# Patient Record
Sex: MALE | Race: WHITE | NOT HISPANIC OR LATINO | URBAN - METROPOLITAN AREA
[De-identification: names, ages, dates, MRNs, and addresses within clinical notes are randomized per-mention and may not be internally consistent; named-entity substitution may affect disease eponyms.]

---

## 2021-01-27 ENCOUNTER — OUTPATIENT (OUTPATIENT)
Dept: OUTPATIENT SERVICES | Facility: HOSPITAL | Age: 26
LOS: 1 days | End: 2021-01-27
Payer: COMMERCIAL

## 2021-01-27 VITALS
DIASTOLIC BLOOD PRESSURE: 86 MMHG | OXYGEN SATURATION: 97 % | WEIGHT: 197.09 LBS | HEART RATE: 94 BPM | RESPIRATION RATE: 16 BRPM | TEMPERATURE: 98 F | HEIGHT: 69 IN | SYSTOLIC BLOOD PRESSURE: 139 MMHG

## 2021-01-27 DIAGNOSIS — Z01.818 ENCOUNTER FOR OTHER PREPROCEDURAL EXAMINATION: ICD-10-CM

## 2021-01-27 DIAGNOSIS — S83.519A SPRAIN OF ANTERIOR CRUCIATE LIGAMENT OF UNSPECIFIED KNEE, INITIAL ENCOUNTER: Chronic | ICD-10-CM

## 2021-01-27 DIAGNOSIS — K08.409 PARTIAL LOSS OF TEETH, UNSPECIFIED CAUSE, UNSPECIFIED CLASS: Chronic | ICD-10-CM

## 2021-01-27 DIAGNOSIS — Z98.890 OTHER SPECIFIED POSTPROCEDURAL STATES: Chronic | ICD-10-CM

## 2021-01-27 DIAGNOSIS — T84.84XA PAIN DUE TO INTERNAL ORTHOPEDIC PROSTHETIC DEVICES, IMPLANTS AND GRAFTS, INITIAL ENCOUNTER: ICD-10-CM

## 2021-01-27 LAB
ANION GAP SERPL CALC-SCNC: 8 MMOL/L — SIGNIFICANT CHANGE UP (ref 5–17)
BASOPHILS # BLD AUTO: 0.05 K/UL — SIGNIFICANT CHANGE UP (ref 0–0.2)
BASOPHILS NFR BLD AUTO: 0.9 % — SIGNIFICANT CHANGE UP (ref 0–2)
BUN SERPL-MCNC: 18 MG/DL — SIGNIFICANT CHANGE UP (ref 7–23)
CALCIUM SERPL-MCNC: 10 MG/DL — SIGNIFICANT CHANGE UP (ref 8.5–10.1)
CHLORIDE SERPL-SCNC: 106 MMOL/L — SIGNIFICANT CHANGE UP (ref 96–108)
CO2 SERPL-SCNC: 26 MMOL/L — SIGNIFICANT CHANGE UP (ref 22–31)
CREAT SERPL-MCNC: 1.31 MG/DL — HIGH (ref 0.5–1.3)
EOSINOPHIL # BLD AUTO: 0.18 K/UL — SIGNIFICANT CHANGE UP (ref 0–0.5)
EOSINOPHIL NFR BLD AUTO: 3.4 % — SIGNIFICANT CHANGE UP (ref 0–6)
GLUCOSE SERPL-MCNC: 101 MG/DL — HIGH (ref 70–99)
HCT VFR BLD CALC: 52.2 % — HIGH (ref 39–50)
HGB BLD-MCNC: 18.2 G/DL — HIGH (ref 13–17)
IMM GRANULOCYTES NFR BLD AUTO: 0.2 % — SIGNIFICANT CHANGE UP (ref 0–1.5)
LYMPHOCYTES # BLD AUTO: 1.75 K/UL — SIGNIFICANT CHANGE UP (ref 1–3.3)
LYMPHOCYTES # BLD AUTO: 32.8 % — SIGNIFICANT CHANGE UP (ref 13–44)
MCHC RBC-ENTMCNC: 30.6 PG — SIGNIFICANT CHANGE UP (ref 27–34)
MCHC RBC-ENTMCNC: 34.9 GM/DL — SIGNIFICANT CHANGE UP (ref 32–36)
MCV RBC AUTO: 87.9 FL — SIGNIFICANT CHANGE UP (ref 80–100)
MONOCYTES # BLD AUTO: 0.41 K/UL — SIGNIFICANT CHANGE UP (ref 0–0.9)
MONOCYTES NFR BLD AUTO: 7.7 % — SIGNIFICANT CHANGE UP (ref 2–14)
NEUTROPHILS # BLD AUTO: 2.93 K/UL — SIGNIFICANT CHANGE UP (ref 1.8–7.4)
NEUTROPHILS NFR BLD AUTO: 55 % — SIGNIFICANT CHANGE UP (ref 43–77)
PLATELET # BLD AUTO: 174 K/UL — SIGNIFICANT CHANGE UP (ref 150–400)
POTASSIUM SERPL-MCNC: 4 MMOL/L — SIGNIFICANT CHANGE UP (ref 3.5–5.3)
POTASSIUM SERPL-SCNC: 4 MMOL/L — SIGNIFICANT CHANGE UP (ref 3.5–5.3)
RBC # BLD: 5.94 M/UL — HIGH (ref 4.2–5.8)
RBC # FLD: 11.9 % — SIGNIFICANT CHANGE UP (ref 10.3–14.5)
SODIUM SERPL-SCNC: 140 MMOL/L — SIGNIFICANT CHANGE UP (ref 135–145)
WBC # BLD: 5.33 K/UL — SIGNIFICANT CHANGE UP (ref 3.8–10.5)
WBC # FLD AUTO: 5.33 K/UL — SIGNIFICANT CHANGE UP (ref 3.8–10.5)

## 2021-01-27 PROCEDURE — G0463: CPT | Mod: 25

## 2021-01-27 PROCEDURE — 85025 COMPLETE CBC W/AUTO DIFF WBC: CPT

## 2021-01-27 PROCEDURE — 80048 BASIC METABOLIC PNL TOTAL CA: CPT

## 2021-01-27 PROCEDURE — 36415 COLL VENOUS BLD VENIPUNCTURE: CPT

## 2021-01-27 NOTE — H&P PST ADULT - NSICDXPASTMEDICALHX_GEN_ALL_CORE_FT
PAST MEDICAL HISTORY:  Asthma Seasonal/Exercise induced    H/O psoriasis     Torn ACL Right    Torn meniscus Right

## 2021-01-27 NOTE — H&P PST ADULT - TEACHING/LEARNING LEARNING PREFERENCES
audio/computer/internet/group instruction/individual instruction/pictorial/skill demonstration/verbal instruction/video/written material

## 2021-01-27 NOTE — H&P PST ADULT - NSICDXPASTSURGICALHX_GEN_ALL_CORE_FT
PAST SURGICAL HISTORY:  ACL tear and meniscus right knee repair---10/2018    History of repair of ACL Right ---1/2013    [place] teeth removed 3 removed --1/2020

## 2021-01-27 NOTE — H&P PST ADULT - NEGATIVE ENMT SYMPTOMS
no hearing difficulty/no tinnitus/no sinus symptoms/no post-nasal discharge/no throat pain/no dysphagia

## 2021-01-27 NOTE — H&P PST ADULT - ASSESSMENT
25 y.o male scheduled for  25 y.o male scheduled for Right Knee Open Removal/Excision of Deep Hardware and any indicated procedures   Plan  1. Stop all NSAIDS, herbal supplements and vitamins for 7 days.  2. NPO at midnight.  3. Take the following medications-none-- with small sips of water on the morning of your procedure/surgery.  4. Use EZ sponges as directed  5. CBC,BMP done today  6. COVID swab appt: 2/2/2021    Denies travel outside of state or country in the last 14 days.   Denies contact with known Coronavirus positive person.  Denies fever, chills, cough, congestion, runny nose, SOB or difficulty breathing, fatigue, muscle or body ache, headache. loss of taste or smell, N/V/D.

## 2021-01-27 NOTE — H&P PST ADULT - HISTORY OF PRESENT ILLNESS
25 y.o WD, WN pleasant  male presents for  25 y.o WD, WN pleasant  male presents for PST with hx of right knee pain. Patient with hx of right knee torn ACL while playing soccer in college. He had surgery back in 2013 and re -injured the right knee tearing ACL and meniscus in 2018. Patient states he had surgery in 2018 with placement of hardware. He did well initially but reports right knee pain, decreased ROM, swelling after exercise or walking for extended period of time. He has followed with ortho and now scheduled for Right Knee Open Removal/Excision of Deep Hardware and any indicated procedures

## 2021-01-28 DIAGNOSIS — T84.84XA PAIN DUE TO INTERNAL ORTHOPEDIC PROSTHETIC DEVICES, IMPLANTS AND GRAFTS, INITIAL ENCOUNTER: ICD-10-CM

## 2021-01-28 DIAGNOSIS — Z01.818 ENCOUNTER FOR OTHER PREPROCEDURAL EXAMINATION: ICD-10-CM

## 2021-02-05 ENCOUNTER — OUTPATIENT (OUTPATIENT)
Dept: INPATIENT UNIT | Facility: HOSPITAL | Age: 26
LOS: 1 days | Discharge: HOME | End: 2021-02-05
Payer: COMMERCIAL

## 2021-02-05 VITALS
RESPIRATION RATE: 18 BRPM | OXYGEN SATURATION: 97 % | HEART RATE: 72 BPM | DIASTOLIC BLOOD PRESSURE: 76 MMHG | TEMPERATURE: 97 F | SYSTOLIC BLOOD PRESSURE: 138 MMHG

## 2021-02-05 VITALS
SYSTOLIC BLOOD PRESSURE: 155 MMHG | WEIGHT: 197.09 LBS | DIASTOLIC BLOOD PRESSURE: 85 MMHG | OXYGEN SATURATION: 99 % | HEART RATE: 75 BPM | TEMPERATURE: 98 F | HEIGHT: 69 IN | RESPIRATION RATE: 16 BRPM

## 2021-02-05 DIAGNOSIS — S83.519A SPRAIN OF ANTERIOR CRUCIATE LIGAMENT OF UNSPECIFIED KNEE, INITIAL ENCOUNTER: Chronic | ICD-10-CM

## 2021-02-05 DIAGNOSIS — Z98.890 OTHER SPECIFIED POSTPROCEDURAL STATES: Chronic | ICD-10-CM

## 2021-02-05 DIAGNOSIS — T84.84XA PAIN DUE TO INTERNAL ORTHOPEDIC PROSTHETIC DEVICES, IMPLANTS AND GRAFTS, INITIAL ENCOUNTER: ICD-10-CM

## 2021-02-05 DIAGNOSIS — K08.409 PARTIAL LOSS OF TEETH, UNSPECIFIED CAUSE, UNSPECIFIED CLASS: Chronic | ICD-10-CM

## 2021-02-05 PROCEDURE — 86900 BLOOD TYPING SEROLOGIC ABO: CPT

## 2021-02-05 PROCEDURE — 86901 BLOOD TYPING SEROLOGIC RH(D): CPT

## 2021-02-05 PROCEDURE — 36415 COLL VENOUS BLD VENIPUNCTURE: CPT

## 2021-02-05 PROCEDURE — 76000 FLUOROSCOPY <1 HR PHYS/QHP: CPT

## 2021-02-05 PROCEDURE — 71045 X-RAY EXAM CHEST 1 VIEW: CPT

## 2021-02-05 PROCEDURE — 86850 RBC ANTIBODY SCREEN: CPT

## 2021-02-05 PROCEDURE — 71045 X-RAY EXAM CHEST 1 VIEW: CPT | Mod: 26

## 2021-02-05 RX ORDER — USTEKINUMAB 45 MG/.5ML
0 INJECTION, SOLUTION SUBCUTANEOUS
Qty: 0 | Refills: 0 | DISCHARGE

## 2021-02-05 RX ORDER — ONDANSETRON 8 MG/1
4 TABLET, FILM COATED ORAL ONCE
Refills: 0 | Status: DISCONTINUED | OUTPATIENT
Start: 2021-02-05 | End: 2021-02-05

## 2021-02-05 RX ORDER — SODIUM CHLORIDE 9 MG/ML
1000 INJECTION, SOLUTION INTRAVENOUS
Refills: 0 | Status: DISCONTINUED | OUTPATIENT
Start: 2021-02-05 | End: 2021-02-05

## 2021-02-05 RX ORDER — OXYCODONE HYDROCHLORIDE 5 MG/1
10 TABLET ORAL ONCE
Refills: 0 | Status: DISCONTINUED | OUTPATIENT
Start: 2021-02-05 | End: 2021-02-05

## 2021-02-05 RX ORDER — CETIRIZINE HYDROCHLORIDE 10 MG/1
1 TABLET ORAL
Qty: 0 | Refills: 0 | DISCHARGE

## 2021-02-05 RX ORDER — ALBUTEROL 90 UG/1
2 AEROSOL, METERED ORAL
Qty: 0 | Refills: 0 | DISCHARGE

## 2021-02-05 RX ORDER — FENTANYL CITRATE 50 UG/ML
50 INJECTION INTRAVENOUS
Refills: 0 | Status: DISCONTINUED | OUTPATIENT
Start: 2021-02-05 | End: 2021-02-05

## 2021-02-05 RX ORDER — FLUTICASONE PROPIONATE 50 MCG
0 SPRAY, SUSPENSION NASAL
Qty: 0 | Refills: 0 | DISCHARGE

## 2021-02-05 RX ADMIN — SODIUM CHLORIDE 75 MILLILITER(S): 9 INJECTION, SOLUTION INTRAVENOUS at 14:53

## 2021-02-05 RX ADMIN — OXYCODONE HYDROCHLORIDE 10 MILLIGRAM(S): 5 TABLET ORAL at 14:53

## 2021-02-05 NOTE — ASU PATIENT PROFILE, ADULT - NSALCOHOLAMT_GEN_A_CORE_SD
I will SWITCH the dose or number of times a day I take the medications listed below when I get home from the hospital:  None
1-2 drinks

## 2021-02-05 NOTE — ASU PATIENT PROFILE, ADULT - PSH
ACL tear  and meniscus right knee repair---10/2018  History of repair of ACL  Right ---1/2013  [place] teeth removed  3 removed --1/2020

## 2021-02-05 NOTE — ASU DISCHARGE PLAN (ADULT/PEDIATRIC) - NURSING INSTRUCTIONS
For any problems or concerns,contact your doctor. [facility] patients should call the [facility]. If you cannot reach the doctor or clinic, call [facility] Emergency Department at [phone] or go to your local Emergency Department.  A responsible adult should be with you for the rest of the day and night for your safety and to help you if you needed. Resume your medications as listed on the attached Medication Record. Begin with liquids and light food ( tea, toast, Jello, soups). Advance to what you normally eat. Liquids should taken in adequate amounts today.     CALL the DOCTOR:    -Fever greater than  101F  - Signs  of infection such as : increase pain,swelling,redness,or a bad  smell coming from the wound.  -Excessive amount of bleeding.  - Any pain that appears to be getting worse.  - Vomiting  -  If you have  not urinated 8 hours after surgery or have any difficulty urinating.     A responsible adult should be with you for the rest of the day and night for your safety and to help you if you needed.    Review attached FACT SHEET if applicable.

## 2021-02-05 NOTE — ASU DISCHARGE PLAN (ADULT/PEDIATRIC) - ASU DC SPECIAL INSTRUCTIONSFT
Knee Arthroscopy Instructions    1) Your knee will swell over the next 48hours and you can expect pain to get a bit worse. Ice your Knee plenty, continuously if possible. Fill up a plastic bag, then wrap it in a towel or pillow case.     2) Elevate your leg above your heart with about 3 pillows when you can, when you are in bed or chair. Otherwise you should be up and about, walking as much as you can tolerate. The more you move the better you will do. Weight bearing as tolerated.    3) Expect some bloody drainage. It is normal. It may even soak through the gauze and ACE bandage and look bloody. This is mostly leftover Saline fluid coming out of your knee from surgery. Even a drop of blood can make it look very bloody. Just place another Gauze and another ACE over it and wrap it snug but not overly tight. If it bleeds through the second bandage again, call.    4) Remove bandage in 48hrs and place waterproof band aides. You can shower in 48 hours. No bath. Pat your incisions dry. No creams, no lotions.    5) Only reason to worry would be if pain got so severe that you cannot feel or wiggle your toes, or if your foot is cold. In this case you need to call or come to the ER. But as long as you can feel and wiggle your toes, you are fine.    6) Call the office to schedule a follow up appointment to be seen in 10 days. Your Sutures will be removed at that point.    7) A pain Rx is in the chart; fill it on your way home. OR was sent electronically to your pharmacy, pick it up on the way home.    8) Please begin  for DVT prophylaxis tomorrow

## 2021-02-05 NOTE — ASU DISCHARGE PLAN (ADULT/PEDIATRIC) - CARE PROVIDER_API CALL
Farooq Cherry)  Orthopaedic Surgery; Sports Medicine  [address] [place] [address]  Phone: [phone]  Fax: [phone]  Follow Up Time:

## 2021-02-12 DIAGNOSIS — M76.31 ILIOTIBIAL BAND SYNDROME, RIGHT LEG: ICD-10-CM

## 2021-02-12 DIAGNOSIS — J45.909 UNSPECIFIED ASTHMA, UNCOMPLICATED: ICD-10-CM

## 2022-07-28 ENCOUNTER — OUTPATIENT (OUTPATIENT)
Dept: OUTPATIENT SERVICES | Facility: HOSPITAL | Age: 27
LOS: 1 days | End: 2022-07-28

## 2022-07-28 DIAGNOSIS — Z00.00 ENCOUNTER FOR GENERAL ADULT MEDICAL EXAMINATION WITHOUT ABNORMAL FINDINGS: ICD-10-CM

## 2022-07-28 DIAGNOSIS — K08.409 PARTIAL LOSS OF TEETH, UNSPECIFIED CAUSE, UNSPECIFIED CLASS: Chronic | ICD-10-CM

## 2022-07-28 DIAGNOSIS — S83.519A SPRAIN OF ANTERIOR CRUCIATE LIGAMENT OF UNSPECIFIED KNEE, INITIAL ENCOUNTER: Chronic | ICD-10-CM

## 2022-07-28 DIAGNOSIS — Z98.890 OTHER SPECIFIED POSTPROCEDURAL STATES: Chronic | ICD-10-CM

## 2022-07-28 PROBLEM — J45.909 UNSPECIFIED ASTHMA, UNCOMPLICATED: Chronic | Status: ACTIVE | Noted: 2021-01-27

## 2022-07-28 PROBLEM — S83.209A UNSPECIFIED TEAR OF UNSPECIFIED MENISCUS, CURRENT INJURY, UNSPECIFIED KNEE, INITIAL ENCOUNTER: Chronic | Status: ACTIVE | Noted: 2021-01-27

## 2022-07-28 PROBLEM — Z87.2 PERSONAL HISTORY OF DISEASES OF THE SKIN AND SUBCUTANEOUS TISSUE: Chronic | Status: ACTIVE | Noted: 2021-01-27

## 2022-08-03 PROBLEM — Z00.00 ENCOUNTER FOR PREVENTIVE HEALTH EXAMINATION: Status: ACTIVE | Noted: 2022-08-03

## 2022-08-05 ENCOUNTER — NON-APPOINTMENT (OUTPATIENT)
Age: 27
End: 2022-08-05

## 2022-08-05 ENCOUNTER — APPOINTMENT (OUTPATIENT)
Dept: CARDIOLOGY | Facility: CLINIC | Age: 27
End: 2022-08-05

## 2022-08-05 VITALS
WEIGHT: 190 LBS | HEART RATE: 110 BPM | OXYGEN SATURATION: 98 % | SYSTOLIC BLOOD PRESSURE: 172 MMHG | BODY MASS INDEX: 28.14 KG/M2 | HEIGHT: 69 IN | DIASTOLIC BLOOD PRESSURE: 82 MMHG

## 2022-08-05 VITALS — DIASTOLIC BLOOD PRESSURE: 84 MMHG | SYSTOLIC BLOOD PRESSURE: 142 MMHG

## 2022-08-05 DIAGNOSIS — Z78.9 OTHER SPECIFIED HEALTH STATUS: ICD-10-CM

## 2022-08-05 DIAGNOSIS — Z82.49 FAMILY HISTORY OF ISCHEMIC HEART DISEASE AND OTHER DISEASES OF THE CIRCULATORY SYSTEM: ICD-10-CM

## 2022-08-05 DIAGNOSIS — J45.909 UNSPECIFIED ASTHMA, UNCOMPLICATED: ICD-10-CM

## 2022-08-05 DIAGNOSIS — L40.9 PSORIASIS, UNSPECIFIED: ICD-10-CM

## 2022-08-05 PROCEDURE — 93000 ELECTROCARDIOGRAM COMPLETE: CPT

## 2022-08-05 PROCEDURE — 99204 OFFICE O/P NEW MOD 45 MIN: CPT | Mod: 25

## 2022-08-05 RX ORDER — ALBUTEROL 90 MCG
90 AEROSOL (GRAM) INHALATION
Refills: 0 | Status: ACTIVE | COMMUNITY

## 2022-08-05 RX ORDER — USTEKINUMAB 45 MG/.5ML
45 INJECTION, SOLUTION SUBCUTANEOUS
Qty: 1 | Refills: 0 | Status: ACTIVE | COMMUNITY
Start: 2022-01-17

## 2022-08-05 NOTE — HISTORY OF PRESENT ILLNESS
[FreeTextEntry1] : 27 year old male sent for cardiac evaluation after pre-employment physical with employee health at [employer] revealed elevated BP readings. Patient states he gets nervous in doctor offices. He has an automated BP machine at home and typically gets <130/80 when relaxed. 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 Prior to him getting his physical he drank a couple of energy drinks and coffee as he works overnights. His sleep is limited at times. Since his employee physical patient has stopped all caffeine. He has no exertional chest pain, dyspnea, or palpitations.\par \par There is no history of MI, CVA, CHF, or previous coronary intervention.\par

## 2022-08-05 NOTE — DISCUSSION/SUMMARY
[FreeTextEntry1] : 1. Hypertension: I suspect reactive component. Asked patient to avoid energy drinks/excessive caffeine intake. Try to get adequate amounts of sleep. Patient's BP cam down to 140s/80s after sitting a few minutes in office. Discussed options, including beta blocker therapy. Discussed side effect profile of beta blocker. Agrees to start Toprol XL 25mg daily. Recommend ETT on Metoprolol to assess BP response to exercise. Will check TSH\par \par Follow up after testing.

## 2022-08-09 ENCOUNTER — APPOINTMENT (OUTPATIENT)
Dept: CARDIOLOGY | Facility: CLINIC | Age: 27
End: 2022-08-09

## 2022-08-09 PROCEDURE — 93015 CV STRESS TEST SUPVJ I&R: CPT

## 2022-08-16 PROBLEM — Z00.00 ENCOUNTER FOR PREVENTIVE HEALTH EXAMINATION: Noted: 2022-08-16

## 2022-08-17 ENCOUNTER — APPOINTMENT (OUTPATIENT)
Dept: CARDIOLOGY | Facility: CLINIC | Age: 27
End: 2022-08-17

## 2022-08-17 VITALS
SYSTOLIC BLOOD PRESSURE: 148 MMHG | WEIGHT: 190 LBS | BODY MASS INDEX: 28.14 KG/M2 | HEART RATE: 83 BPM | OXYGEN SATURATION: 100 % | DIASTOLIC BLOOD PRESSURE: 80 MMHG | HEIGHT: 69 IN

## 2022-08-17 PROCEDURE — 99214 OFFICE O/P EST MOD 30 MIN: CPT

## 2022-08-17 NOTE — CARDIOLOGY SUMMARY
[de-identified] : 8/5/2022, Sinus Tachycardia, otherwise normal ECG [de-identified] : 8/9/2022, Plain Treadmill Stress Test: Exercised for 11 minutes and 43 seconds utilizing Standard Jose Protocol. No chest pain or abnormal dyspnea. No ECG changes to suggest ischemia. Resting BP- 150/68, Peak /80.\par

## 2022-08-17 NOTE — HISTORY OF PRESENT ILLNESS
[FreeTextEntry1] : Historical Perspective:\par 27 year old male sent for cardiac evaluation after pre-employment physical with employee health at [employer] revealed elevated BP readings. Patient states he gets nervous in doctor offices. He has an automated BP machine at home and typically gets <130/80 when relaxed. 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 His sleep is limited at times. Since his employee physical patient has stopped all caffeine. He has no exertional chest pain, dyspnea, or palpitations.\par \par There is no history of MI, CVA, CHF, or previous coronary intervention.\par \par Current Health Status:\par Patient with no chest pain, SOB, or palpitations. No hospitalizations since seeing me last. Remains compliant with his medications and reports no adverse effects.\par \par

## 2022-08-17 NOTE — DISCUSSION/SUMMARY
[FreeTextEntry1] : 1. Hypertension: I suspect reactive component. Asked patient to continue to avoid energy drinks/excessive caffeine intake. Try to get adequate amounts of sleep. Patient's BP improved on Toprol XL 25mg daily. Recommend continuing. Discussed side effect profile, which he hasn't experienced any. Recommend secondary workup, which include 24 hour urine, labs, and CTA of the abdomen and pelvis. Should hold metoprolol 3 days prior to his urine and blood draws. Will make further recommendations once testing reviewed. \par \par Follow up in 6 months.

## 2023-02-16 ENCOUNTER — APPOINTMENT (OUTPATIENT)
Dept: FAMILY MEDICINE | Facility: CLINIC | Age: 28
End: 2023-02-16

## 2023-02-21 ENCOUNTER — NON-APPOINTMENT (OUTPATIENT)
Age: 28
End: 2023-02-21

## 2023-02-21 ENCOUNTER — APPOINTMENT (OUTPATIENT)
Dept: CARDIOLOGY | Facility: CLINIC | Age: 28
End: 2023-02-21
Payer: COMMERCIAL

## 2023-02-21 VITALS
OXYGEN SATURATION: 98 % | HEART RATE: 99 BPM | BODY MASS INDEX: 28.14 KG/M2 | HEIGHT: 69 IN | SYSTOLIC BLOOD PRESSURE: 156 MMHG | DIASTOLIC BLOOD PRESSURE: 80 MMHG | WEIGHT: 190 LBS

## 2023-02-21 PROCEDURE — 99214 OFFICE O/P EST MOD 30 MIN: CPT | Mod: 25

## 2023-02-21 PROCEDURE — 93000 ELECTROCARDIOGRAM COMPLETE: CPT

## 2023-02-21 RX ORDER — METOPROLOL SUCCINATE 25 MG/1
25 TABLET, EXTENDED RELEASE ORAL DAILY
Qty: 90 | Refills: 3 | Status: DISCONTINUED | COMMUNITY
Start: 2022-08-05 | End: 2023-02-21

## 2023-02-21 NOTE — DISCUSSION/SUMMARY
[FreeTextEntry1] : 1. Hypertension: Asked patient to continue to avoid energy drinks/excessive caffeine intake. Try to get adequate amounts of sleep. Patient's BP improved on Toprol XL 25mg daily, but did not continue taking it after his 30 day supply ended. I explained to patient that this will likely be a long term medication.  Recommend continuing. Discussed side effect profile, which he hasn't experienced any. Recommended secondary workup, which included 24 hour urine, labs, and CTA of the abdomen and pelvis if any abnormalities demonstrated. Will make further recommendations once testing reviewed. \par \par Follow up in 3 months. [EKG obtained to assist in diagnosis and management of assessed problem(s)] : EKG obtained to assist in diagnosis and management of assessed problem(s)

## 2023-02-21 NOTE — CARDIOLOGY SUMMARY
[de-identified] : 2/21/2023, NSR. [de-identified] : 8/9/2022, Plain Treadmill Stress Test: Exercised for 11 minutes and 43 seconds utilizing Standard Jose Protocol. No chest pain or abnormal dyspnea. No ECG changes to suggest ischemia. Resting BP- 150/68, Peak /80.\par

## 2023-02-21 NOTE — HISTORY OF PRESENT ILLNESS
[FreeTextEntry1] : Historical Perspective:\par 27 year old male sent for cardiac evaluation after pre-employment physical with employee health at [employer] revealed elevated BP readings. Patient states he gets nervous in doctor offices. He has an automated BP machine at home and typically gets <130/80 when relaxed. 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 His sleep is limited at times. Since his employee physical patient has stopped all caffeine. He has no exertional chest pain, dyspnea, or palpitations.\par \par There is no history of MI, CVA, CHF, or previous coronary intervention.\par \par Current Health Status:\par Patient with no chest pain, SOB, or palpitations. No hospitalizations since seeing me last. Patient ran out of Metoprolol after the 30 day supply. Didn't renew it. Just got labs and urine studies done over the weekend.\par \par

## 2023-05-23 ENCOUNTER — NON-APPOINTMENT (OUTPATIENT)
Age: 28
End: 2023-05-23

## 2023-05-23 ENCOUNTER — APPOINTMENT (OUTPATIENT)
Dept: CARDIOLOGY | Facility: CLINIC | Age: 28
End: 2023-05-23
Payer: COMMERCIAL

## 2023-05-23 VITALS
HEIGHT: 69 IN | OXYGEN SATURATION: 98 % | WEIGHT: 185 LBS | BODY MASS INDEX: 27.4 KG/M2 | DIASTOLIC BLOOD PRESSURE: 82 MMHG | HEART RATE: 86 BPM | SYSTOLIC BLOOD PRESSURE: 148 MMHG

## 2023-05-23 PROCEDURE — 99214 OFFICE O/P EST MOD 30 MIN: CPT

## 2023-05-23 NOTE — HISTORY OF PRESENT ILLNESS
[FreeTextEntry1] : Historical Perspective:\par 28 year old male sent for cardiac evaluation after pre-employment physical with employee health at [employer] revealed elevated BP readings. Patient states he gets nervous in doctor offices. He has an automated BP machine at home and typically gets <130/80 when relaxed. 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 His sleep is limited at times. Since his employee physical patient has stopped all caffeine. He has no exertional chest pain, dyspnea, or palpitations.\par \par There is no history of MI, CVA, CHF, or previous coronary intervention.\par \par Current Health Status:\par Patient with no chest pain, SOB, or palpitations. No hospitalizations since seeing me last. Remains compliant with his medications and reports no adverse effects.\par .\par \par

## 2023-05-23 NOTE — DISCUSSION/SUMMARY
[FreeTextEntry1] : 1. Hypertension: Asked patient to continue to avoid energy drinks/excessive caffeine intake. Try to get adequate amounts of sleep. Patient's BP improved on Toprol XL 25mg daily. I explained to patient that this will likely be a long term medication.  Recommend continuing. Discussed side effect profile, which he hasn't experienced any. Recommended secondary workup, which included 24 hour urine, labs, and CTA of the abdomen and pelvis if any abnormalities demonstrated. Will make further recommendations once testing reviewed. \par \par Follow up in 12 months.

## 2023-05-23 NOTE — CARDIOLOGY SUMMARY
[de-identified] : 2/21/2023, NSR. [de-identified] : 8/9/2022, Plain Treadmill Stress Test: Exercised for 11 minutes and 43 seconds utilizing Standard Jose Protocol. No chest pain or abnormal dyspnea. No ECG changes to suggest ischemia. Resting BP- 150/68, Peak /80.\par

## 2023-06-09 ENCOUNTER — APPOINTMENT (OUTPATIENT)
Dept: ORTHOPEDIC SURGERY | Facility: CLINIC | Age: 28
End: 2023-06-09
Payer: COMMERCIAL

## 2023-06-09 VITALS — WEIGHT: 185 LBS | HEIGHT: 69 IN | BODY MASS INDEX: 27.4 KG/M2

## 2023-06-09 PROCEDURE — 73502 X-RAY EXAM HIP UNI 2-3 VIEWS: CPT

## 2023-06-09 PROCEDURE — 99203 OFFICE O/P NEW LOW 30 MIN: CPT

## 2023-06-09 NOTE — HISTORY OF PRESENT ILLNESS
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 He has since developed ecchymosis posterior thigh and has been ambulating with a limp \par \par pmh prior right knee acl * 3 most recently 20`8  revision by Dr Cherry [] : no [FreeTextEntry1] : right groin  [FreeTextEntry3] : 5/30/23 [FreeTextEntry5] : patient was injured while playing soccer. Mentions that he has had sx on the right knee 3 times

## 2023-06-09 NOTE — PHYSICAL EXAM
[Right] : right hip with pelvis [AP] : anteroposterior [Lateral] : lateral [There are no fractures, subluxations or dislocations. No significant abnormalities are seen] : There are no fractures, subluxations or dislocations. No significant abnormalities are seen

## 2023-06-09 NOTE — IMAGING
[Right] : right hip [de-identified] : right thigh- sif + ecchymosis posterior medial, + ttp proximal adductor brittany , ambulates with limp  [FreeTextEntry9] : and pelvis normal no avulsions appreciated

## 2023-07-20 ENCOUNTER — APPOINTMENT (OUTPATIENT)
Dept: ORTHOPEDIC SURGERY | Facility: CLINIC | Age: 28
End: 2023-07-20
Payer: COMMERCIAL

## 2023-07-20 VITALS — WEIGHT: 185 LBS | HEIGHT: 69 IN | BODY MASS INDEX: 27.4 KG/M2

## 2023-07-20 PROCEDURE — 99213 OFFICE O/P EST LOW 20 MIN: CPT

## 2023-07-20 NOTE — REASON FOR VISIT
[FreeTextEntry2] : 7/20/23- Ecchymosis improving, pain lessening, hasn't been able to start any PT of yet

## 2023-07-20 NOTE — HISTORY OF PRESENT ILLNESS
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 He has since developed ecchymosis posterior thigh and has been ambulating with a limp \par \par pmh prior right knee acl * 3 most recently 20`8  revision by Dr Cherry [] : no [FreeTextEntry1] : right groin  [FreeTextEntry3] : 5/30/23 [FreeTextEntry5] : patient was injured while playing soccer. Mentions that he has had sx on the right knee 3 times

## 2023-07-20 NOTE — PHYSICAL EXAM
[] : adductor tenderness [Right] : right hip with pelvis [AP] : anteroposterior [Lateral] : lateral [There are no fractures, subluxations or dislocations. No significant abnormalities are seen] : There are no fractures, subluxations or dislocations. No significant abnormalities are seen

## 2023-08-24 ENCOUNTER — APPOINTMENT (OUTPATIENT)
Dept: ORTHOPEDIC SURGERY | Facility: CLINIC | Age: 28
End: 2023-08-24
Payer: COMMERCIAL

## 2023-08-24 DIAGNOSIS — S76.211A STRAIN OF ADDUCTOR MUSCLE, FASCIA AND TENDON OF RIGHT THIGH, INITIAL ENCOUNTER: ICD-10-CM

## 2023-08-24 PROCEDURE — 99213 OFFICE O/P EST LOW 20 MIN: CPT

## 2023-08-24 NOTE — HISTORY OF PRESENT ILLNESS
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 He has since developed ecchymosis posterior thigh and has been ambulating with a limp \par  \par  pmh prior right knee acl * 3 most recently 20`8  revision by Dr Cherry [] : no [FreeTextEntry1] : right groin  [FreeTextEntry3] : 5/30/23 [FreeTextEntry5] : patient was injured while playing soccer. Mentions that he has had sx on the right knee 3 times

## 2023-08-24 NOTE — REASON FOR VISIT
[FreeTextEntry2] : 8/24/23- Doing better, has been doing PT 7/20/23- Ecchymosis improving, pain lessening, hasn't been able to start any PT of yet

## 2024-03-11 ENCOUNTER — RX RENEWAL (OUTPATIENT)
Age: 29
End: 2024-03-11

## 2024-04-24 ENCOUNTER — APPOINTMENT (OUTPATIENT)
Dept: CARDIOLOGY | Facility: CLINIC | Age: 29
End: 2024-04-24
Payer: COMMERCIAL

## 2024-04-24 ENCOUNTER — NON-APPOINTMENT (OUTPATIENT)
Age: 29
End: 2024-04-24

## 2024-04-24 VITALS
OXYGEN SATURATION: 96 % | WEIGHT: 170 LBS | SYSTOLIC BLOOD PRESSURE: 140 MMHG | BODY MASS INDEX: 25.18 KG/M2 | HEART RATE: 93 BPM | DIASTOLIC BLOOD PRESSURE: 70 MMHG | HEIGHT: 69 IN

## 2024-04-24 DIAGNOSIS — Z13.6 ENCOUNTER FOR SCREENING FOR CARDIOVASCULAR DISORDERS: ICD-10-CM

## 2024-04-24 DIAGNOSIS — Z79.899 OTHER LONG TERM (CURRENT) DRUG THERAPY: ICD-10-CM

## 2024-04-24 DIAGNOSIS — I10 ESSENTIAL (PRIMARY) HYPERTENSION: ICD-10-CM

## 2024-04-24 PROCEDURE — 99214 OFFICE O/P EST MOD 30 MIN: CPT

## 2024-04-24 PROCEDURE — 93000 ELECTROCARDIOGRAM COMPLETE: CPT

## 2024-04-24 PROCEDURE — G2211 COMPLEX E/M VISIT ADD ON: CPT

## 2024-04-24 RX ORDER — METOPROLOL SUCCINATE 25 MG/1
25 TABLET, EXTENDED RELEASE ORAL
Qty: 90 | Refills: 3 | Status: ACTIVE | COMMUNITY
Start: 2023-02-21 | End: 1900-01-01

## 2024-04-24 NOTE — DISCUSSION/SUMMARY
[FreeTextEntry1] : 1. Hypertension: Asked patient to continue to avoid energy drinks/excessive caffeine intake. Try to get adequate amounts of sleep. Patient's BP improved on Toprol XL 25mg daily (high risk medication with no signs of toxicity) I explained to patient that this will likely be a long term medication.  Recommend continuing. Discussed side effect profile, which he hasn't experienced any. Recommended secondary workup, which included 24 hour urine, labs, which was normal.   Follow up in 12 months. [EKG obtained to assist in diagnosis and management of assessed problem(s)] : EKG obtained to assist in diagnosis and management of assessed problem(s)

## 2024-04-24 NOTE — HISTORY OF PRESENT ILLNESS
[FreeTextEntry1] : Historical Perspective: 29 year old male sent for cardiac evaluation after pre-employment physical with employee health at [employer] revealed elevated BP readings. Patient states he gets nervous in doctor offices. He has an automated BP machine at home and typically gets <130/80 when relaxed. 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 His sleep is limited at times. Since his employee physical patient has stopped all caffeine. He has no exertional chest pain, dyspnea, or palpitations.  There is no history of MI, CVA, CHF, or previous coronary intervention.  Current Health Status: Patient with no chest pain, SOB, or palpitations. No hospitalizations since seeing me last. Remains compliant with his medications and reports no adverse effects. .

## 2024-04-24 NOTE — CARDIOLOGY SUMMARY
[de-identified] : 4/24/2024, NSR with sinus arrhythmia 2/21/2023, NSR. [de-identified] : 8/9/2022, Plain Treadmill Stress Test: Exercised for 11 minutes and 43 seconds utilizing Standard Jose Protocol. No chest pain or abnormal dyspnea. No ECG changes to suggest ischemia. Resting BP- 150/68, Peak /80.\par

## 2025-05-30 ENCOUNTER — NON-APPOINTMENT (OUTPATIENT)
Age: 30
End: 2025-05-30

## 2025-05-30 ENCOUNTER — APPOINTMENT (OUTPATIENT)
Dept: CARDIOLOGY | Facility: CLINIC | Age: 30
End: 2025-05-30
Payer: COMMERCIAL

## 2025-05-30 VITALS
WEIGHT: 180 LBS | HEIGHT: 69 IN | DIASTOLIC BLOOD PRESSURE: 72 MMHG | HEART RATE: 78 BPM | BODY MASS INDEX: 26.66 KG/M2 | OXYGEN SATURATION: 98 % | SYSTOLIC BLOOD PRESSURE: 126 MMHG

## 2025-05-30 DIAGNOSIS — Z13.6 ENCOUNTER FOR SCREENING FOR CARDIOVASCULAR DISORDERS: ICD-10-CM

## 2025-05-30 DIAGNOSIS — I10 ESSENTIAL (PRIMARY) HYPERTENSION: ICD-10-CM

## 2025-05-30 PROCEDURE — 99213 OFFICE O/P EST LOW 20 MIN: CPT

## 2025-05-30 PROCEDURE — 93000 ELECTROCARDIOGRAM COMPLETE: CPT

## 2025-08-14 ENCOUNTER — APPOINTMENT (OUTPATIENT)
Dept: ORTHOPEDIC SURGERY | Facility: CLINIC | Age: 30
End: 2025-08-14
Payer: COMMERCIAL

## 2025-08-14 VITALS — HEIGHT: 69 IN | WEIGHT: 185 LBS | BODY MASS INDEX: 27.4 KG/M2

## 2025-08-14 DIAGNOSIS — A09 INFECTIOUS GASTROENTERITIS AND COLITIS, UNSPECIFIED: ICD-10-CM

## 2025-08-14 PROCEDURE — 73564 X-RAY EXAM KNEE 4 OR MORE: CPT | Mod: RT

## 2025-08-14 PROCEDURE — 99214 OFFICE O/P EST MOD 30 MIN: CPT

## 2025-08-15 ENCOUNTER — APPOINTMENT (OUTPATIENT)
Dept: MRI IMAGING | Facility: CLINIC | Age: 30
End: 2025-08-15
Payer: COMMERCIAL

## 2025-08-15 PROCEDURE — 73721 MRI JNT OF LWR EXTRE W/O DYE: CPT | Mod: RT

## 2025-08-21 ENCOUNTER — APPOINTMENT (OUTPATIENT)
Dept: ORTHOPEDIC SURGERY | Facility: CLINIC | Age: 30
End: 2025-08-21
Payer: COMMERCIAL

## 2025-08-21 VITALS — HEIGHT: 69 IN | BODY MASS INDEX: 27.4 KG/M2 | WEIGHT: 185 LBS

## 2025-08-21 DIAGNOSIS — M25.561 PAIN IN RIGHT KNEE: ICD-10-CM

## 2025-08-21 DIAGNOSIS — S76.311A STRAIN OF MUSCLE, FASCIA AND TENDON OF THE POSTERIOR MUSCLE GROUP AT THIGH LEVEL, RIGHT THIGH, INITIAL ENCOUNTER: ICD-10-CM

## 2025-08-21 PROCEDURE — 99214 OFFICE O/P EST MOD 30 MIN: CPT

## 2025-08-21 RX ORDER — NAPROXEN 500 MG/1
500 TABLET ORAL
Qty: 60 | Refills: 0 | Status: ACTIVE | COMMUNITY
Start: 2025-08-21 | End: 1900-01-01

## 2025-08-24 PROBLEM — S76.311A STRAIN OF RIGHT HAMSTRING, INITIAL ENCOUNTER: Status: ACTIVE | Noted: 2025-08-24
